# Patient Record
(demographics unavailable — no encounter records)

---

## 2024-10-08 NOTE — PHYSICAL EXAM
[de-identified] : Left wrist/hand Can make a full fist and has full finger extension  some swelling noted to forearm TTP at fx site [de-identified] : xrays of left wrist, 3 views, from urgent care were reviewed by myself which demonstrated buckle fx to distal both bones with 15 degrees of radial volar angulation at metaphyssis

## 2024-10-08 NOTE — HISTORY OF PRESENT ILLNESS
[FreeTextEntry1] : DOI 10/5/24  This is a very pleasant 2-year-old female who is presenting to me for a left wrist injury sustained a few days prior, here today with her mother. She fell from standing and had immediate pain. Was seen at a local urgent care where Xrays were remarkable for a distal both bone buckle fx. she was told to follow up with orhtopedics. Mother reports minimal pain since injury.

## 2024-10-08 NOTE — PROCEDURE
[] : left  [FreeTextEntry1] : 2-year-old female with a left distal both bone fracture, relatively well aligned with slight volar angulation of the distal radius.  I had an extensive discussion with the mother on how this is technically a buckle fracture but there is significant angulation and it is both the radius and ulna and I would suggest putting her in a short arm cast for the next 2 weeks.  I would like to see her back in 2 weeks for repeat x-rays and examination.  If she is not tender at the fracture site I would consider removing the cast and placing her in a removable wrist brace.  We discussed cast precautions also.  School note provided.  All questions were answered.

## 2024-10-22 NOTE — PHYSICAL EXAM
[de-identified] : Left wrist/hand Can make a full fist and has full finger extension No swelling TTP at fx site [de-identified] :    Xray with 3 views of the left wrist was done in office today, 10/22/24 , and reviewed by Dr. Durán, demonstrated  buckle fx to distal both bones with 15 degrees of radial volar angulation at metaphysis - interval callus formation

## 2024-10-22 NOTE — HISTORY OF PRESENT ILLNESS
[FreeTextEntry1] : DOI 10/5/24  2F with a left distal both bone buckle fracture, at her last visit there was significant angulation she is tender at the fracture site so we decided to place her in a short arm cast which she tolerated well.  She has been in a short arm cast for the past 2 weeks and is here today for repeat x-rays and cast removal and further assessment.  She is here today with her mother.

## 2024-10-22 NOTE — DISCUSSION/SUMMARY
[FreeTextEntry1] : 2-year-old female with a left distal both bone fracture, relatively well aligned with slight volar angulation of the distal radius now x2 weeks out from injury, no swelling on exam and minimal pain with palpation. She also has significant callus formation on Xray. I transistioned her to a removable wrist splint today to be worn for the next 3-4 weeks. I would like to see her back in 4 weeks to ensure no pain and repeat Xrays.

## 2024-11-19 NOTE — DISCUSSION/SUMMARY
[FreeTextEntry1] : 2-year-old female now 6 weeks out from a left distal both bone fracture with imaging showing a healed fracture with improved alignment and remodeling.  On exam she has painless range of motion, full.  She can return to all activities without any restrictions.  Follow-up on an as-needed basis.  Answered all questions.

## 2024-11-19 NOTE — HISTORY OF PRESENT ILLNESS
[FreeTextEntry1] : DOI 10/5/24  2F with a left distal both bone buckle fracture, treated with a cast and most recently a removable wrist splint.  Mom reports that she has not had any pain discontinue the splint a few days prior. Here today for repeat examination and x-rays.

## 2024-11-19 NOTE — PHYSICAL EXAM
[de-identified] : Left arm No swelling or deformity  Full elbow range of motion, painless Full pronation and supination, painless similar to the contralateral side Full wrist range of motion Can make a full fist has full finger extension Not tender to ovation of the fracture site Sensation tact light touch all distal fingertips  [de-identified] : X-rays of the left wrist were performed today, 11/19/2024, 3 views which show a healed left distal both bone fracture with improved alignment from previous imaging on 10/22/2024.